# Patient Record
Sex: MALE | Race: WHITE | NOT HISPANIC OR LATINO | Employment: FULL TIME | URBAN - METROPOLITAN AREA
[De-identification: names, ages, dates, MRNs, and addresses within clinical notes are randomized per-mention and may not be internally consistent; named-entity substitution may affect disease eponyms.]

---

## 2023-04-06 ENCOUNTER — APPOINTMENT (OUTPATIENT)
Dept: LAB | Facility: CLINIC | Age: 28
End: 2023-04-06

## 2023-04-06 ENCOUNTER — APPOINTMENT (OUTPATIENT)
Dept: URGENT CARE | Facility: CLINIC | Age: 28
End: 2023-04-06

## 2023-04-06 DIAGNOSIS — Z02.1 PRE-EMPLOYMENT HEALTH SCREENING EXAMINATION: Primary | ICD-10-CM

## 2023-04-06 DIAGNOSIS — Z02.1 PRE-EMPLOYMENT HEALTH SCREENING EXAMINATION: ICD-10-CM

## 2023-04-06 LAB — RUBV IGG SERPL IA-ACNC: >175 IU/ML

## 2023-04-07 LAB
MEV IGG SER QL IA: NORMAL
MUV IGG SER QL IA: NORMAL
VZV IGG SER QL IA: NORMAL

## 2023-09-03 ENCOUNTER — APPOINTMENT (EMERGENCY)
Dept: RADIOLOGY | Facility: HOSPITAL | Age: 28
End: 2023-09-03
Payer: COMMERCIAL

## 2023-09-03 ENCOUNTER — HOSPITAL ENCOUNTER (EMERGENCY)
Facility: HOSPITAL | Age: 28
Discharge: HOME | End: 2023-09-03
Attending: EMERGENCY MEDICINE | Admitting: EMERGENCY MEDICINE
Payer: COMMERCIAL

## 2023-09-03 ENCOUNTER — APPOINTMENT (EMERGENCY)
Dept: CT IMAGING | Facility: HOSPITAL | Age: 28
End: 2023-09-03
Payer: COMMERCIAL

## 2023-09-03 VITALS
OXYGEN SATURATION: 96 % | RESPIRATION RATE: 17 BRPM | HEIGHT: 66 IN | HEART RATE: 85 BPM | SYSTOLIC BLOOD PRESSURE: 129 MMHG | TEMPERATURE: 98 F | WEIGHT: 240 LBS | DIASTOLIC BLOOD PRESSURE: 78 MMHG | BODY MASS INDEX: 38.57 KG/M2

## 2023-09-03 DIAGNOSIS — S49.92XA INJURY OF LEFT SHOULDER, INITIAL ENCOUNTER: ICD-10-CM

## 2023-09-03 DIAGNOSIS — S89.92XA INJURY OF LEFT KNEE, INITIAL ENCOUNTER: ICD-10-CM

## 2023-09-03 DIAGNOSIS — S20.212A BRUISED RIBS, LEFT, INITIAL ENCOUNTER: Primary | ICD-10-CM
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
## 2023-09-03 LAB
ANION GAP SERPL CALCULATED.3IONS-SCNC: 7 MMOL/L
APTT PPP: 25 SECONDS (ref 23–37)
ATRIAL RATE: 85 BPM
BASOPHILS # BLD AUTO: 0.07 THOUSANDS/ÂΜL (ref 0–0.1)
BASOPHILS NFR BLD AUTO: 1 % (ref 0–1)
BUN SERPL-MCNC: 18 MG/DL (ref 5–25)
CALCIUM SERPL-MCNC: 9.8 MG/DL (ref 8.4–10.2)
CHLORIDE SERPL-SCNC: 104 MMOL/L (ref 96–108)
CO2 SERPL-SCNC: 27 MMOL/L (ref 21–32)
CREAT SERPL-MCNC: 0.88 MG/DL (ref 0.6–1.3)
EOSINOPHIL # BLD AUTO: 0.18 THOUSAND/ÂΜL (ref 0–0.61)
EOSINOPHIL NFR BLD AUTO: 2 % (ref 0–6)
ERYTHROCYTE [DISTWIDTH] IN BLOOD BY AUTOMATED COUNT: 13.2 % (ref 11.6–15.1)
GFR SERPL CREATININE-BSD FRML MDRD: 115 ML/MIN/1.73SQ M
GLUCOSE SERPL-MCNC: 97 MG/DL (ref 65–140)
HCT VFR BLD AUTO: 44.7 % (ref 36.5–49.3)
HGB BLD-MCNC: 15.5 G/DL (ref 12–17)
IMM GRANULOCYTES # BLD AUTO: 0.1 THOUSAND/UL (ref 0–0.2)
IMM GRANULOCYTES NFR BLD AUTO: 1 % (ref 0–2)
INR PPP: 0.9 (ref 0.84–1.19)
LYMPHOCYTES # BLD AUTO: 4.78 THOUSANDS/ÂΜL (ref 0.6–4.47)
LYMPHOCYTES NFR BLD AUTO: 50 % (ref 14–44)
MCH RBC QN AUTO: 29.9 PG (ref 26.8–34.3)
MCHC RBC AUTO-ENTMCNC: 34.7 G/DL (ref 31.4–37.4)
MCV RBC AUTO: 86 FL (ref 82–98)
MONOCYTES # BLD AUTO: 0.81 THOUSAND/ÂΜL (ref 0.17–1.22)
MONOCYTES NFR BLD AUTO: 9 % (ref 4–12)
NEUTROPHILS # BLD AUTO: 3.49 THOUSANDS/ÂΜL (ref 1.85–7.62)
NEUTS SEG NFR BLD AUTO: 37 % (ref 43–75)
NRBC BLD AUTO-RTO: 0 /100 WBCS
P AXIS: 54 DEGREES
PLATELET # BLD AUTO: 302 THOUSANDS/UL (ref 149–390)
PMV BLD AUTO: 10.4 FL (ref 8.9–12.7)
POTASSIUM SERPL-SCNC: 3.7 MMOL/L (ref 3.5–5.3)
PR INTERVAL: 148 MS
PROTHROMBIN TIME: 12.7 SECONDS (ref 11.6–14.5)
QRS AXIS: 12 DEGREES
QRSD INTERVAL: 98 MS
QT INTERVAL: 368 MS
QTC INTERVAL: 437 MS
RBC # BLD AUTO: 5.19 MILLION/UL (ref 3.88–5.62)
SODIUM SERPL-SCNC: 138 MMOL/L (ref 135–147)
T WAVE AXIS: 47 DEGREES
VENTRICULAR RATE: 85 BPM
WBC # BLD AUTO: 9.43 THOUSAND/UL (ref 4.31–10.16)

## 2023-09-03 PROCEDURE — 85610 PROTHROMBIN TIME: CPT | Performed by: NURSE PRACTITIONER

## 2023-09-03 PROCEDURE — 71045 X-RAY EXAM CHEST 1 VIEW: CPT

## 2023-09-03 PROCEDURE — 80048 BASIC METABOLIC PNL TOTAL CA: CPT | Performed by: NURSE PRACTITIONER

## 2023-09-03 PROCEDURE — 73030 X-RAY EXAM OF SHOULDER: CPT

## 2023-09-03 PROCEDURE — 99284 EMERGENCY DEPT VISIT MOD MDM: CPT | Performed by: EMERGENCY MEDICINE

## 2023-09-03 PROCEDURE — 85025 COMPLETE CBC W/AUTO DIFF WBC: CPT | Performed by: NURSE PRACTITIONER

## 2023-09-03 PROCEDURE — 71260 CT THORAX DX C+: CPT

## 2023-09-03 PROCEDURE — 85730 THROMBOPLASTIN TIME PARTIAL: CPT | Performed by: NURSE PRACTITIONER

## 2023-09-03 PROCEDURE — 93005 ELECTROCARDIOGRAM TRACING: CPT

## 2023-09-03 PROCEDURE — 73564 X-RAY EXAM KNEE 4 OR MORE: CPT

## 2023-09-03 PROCEDURE — 74177 CT ABD & PELVIS W/CONTRAST: CPT

## 2023-09-03 PROCEDURE — 96374 THER/PROPH/DIAG INJ IV PUSH: CPT

## 2023-09-03 PROCEDURE — 93010 ELECTROCARDIOGRAM REPORT: CPT | Performed by: INTERNAL MEDICINE

## 2023-09-03 PROCEDURE — 36415 COLL VENOUS BLD VENIPUNCTURE: CPT | Performed by: NURSE PRACTITIONER

## 2023-09-03 PROCEDURE — 99285 EMERGENCY DEPT VISIT HI MDM: CPT

## 2023-09-03 RX ORDER — MORPHINE SULFATE 4 MG/ML
4 INJECTION, SOLUTION INTRAMUSCULAR; INTRAVENOUS ONCE
Status: COMPLETED | OUTPATIENT
Start: 2023-09-03 | End: 2023-09-03

## 2023-09-03 RX ORDER — HYDROCODONE BITARTRATE AND ACETAMINOPHEN 5; 325 MG/1; MG/1
1 TABLET ORAL EVERY 6 HOURS PRN
Qty: 20 TABLET | Refills: 0 | Status: SHIPPED | OUTPATIENT
Start: 2023-09-03

## 2023-09-03 RX ADMIN — MORPHINE SULFATE 4 MG: 4 INJECTION INTRAVENOUS at 07:06

## 2023-09-03 RX ADMIN — IOHEXOL 100 ML: 350 INJECTION, SOLUTION INTRAVENOUS at 07:30

## 2023-09-03 NOTE — Clinical Note
Keila Castrotasia was seen and treated in our emergency department on 9/3/2023. Diagnosis:     Erlin Green  may return to work on return date. He may return on this date: 09/06/2023         If you have any questions or concerns, please don't hesitate to call.       JORDYN Ludwig    ______________________________           _______________          _______________  Hospital Representative                              Date                                Time

## 2023-09-03 NOTE — ED NOTES
Discharged reviewed with pt. Pt verbalized understanding and has no further questions at this time. Pt ambulatory off unit with steady gait.      Leverne Hatchet, RN  09/03/23 3429
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
## 2023-09-15 RX ORDER — METHOCARBAMOL 750 MG/1
750 TABLET, FILM COATED ORAL 4 TIMES DAILY PRN
Qty: 30 TABLET | Refills: 0 | Status: SHIPPED | OUTPATIENT
Start: 2023-09-15 | End: 2023-09-25

## 2023-12-04 ENCOUNTER — OFFICE VISIT (OUTPATIENT)
Dept: FAMILY MEDICINE CLINIC | Facility: CLINIC | Age: 28
End: 2023-12-04
Payer: COMMERCIAL

## 2023-12-04 VITALS
BODY MASS INDEX: 39.86 KG/M2 | HEART RATE: 78 BPM | TEMPERATURE: 97.8 F | HEIGHT: 66 IN | DIASTOLIC BLOOD PRESSURE: 82 MMHG | SYSTOLIC BLOOD PRESSURE: 138 MMHG | WEIGHT: 248 LBS | OXYGEN SATURATION: 97 %

## 2023-12-04 DIAGNOSIS — Z13.220 NEED FOR LIPID SCREENING: ICD-10-CM

## 2023-12-04 DIAGNOSIS — Z13.1 SCREENING FOR DIABETES MELLITUS: ICD-10-CM

## 2023-12-04 DIAGNOSIS — Z00.00 HEALTH MAINTENANCE EXAMINATION: Primary | ICD-10-CM

## 2023-12-04 DIAGNOSIS — K64.9 HEMORRHOIDS, UNSPECIFIED HEMORRHOID TYPE: ICD-10-CM

## 2023-12-04 DIAGNOSIS — R51.9 NONINTRACTABLE HEADACHE, UNSPECIFIED CHRONICITY PATTERN, UNSPECIFIED HEADACHE TYPE: ICD-10-CM

## 2023-12-04 DIAGNOSIS — S20.212A BRUISED RIBS, LEFT, INITIAL ENCOUNTER: ICD-10-CM

## 2023-12-04 DIAGNOSIS — M62.830 MUSCLE SPASM OF BACK: ICD-10-CM

## 2023-12-04 PROCEDURE — 99203 OFFICE O/P NEW LOW 30 MIN: CPT | Performed by: FAMILY MEDICINE

## 2023-12-04 RX ORDER — RIMEGEPANT SULFATE 75 MG/75MG
75 TABLET, ORALLY DISINTEGRATING ORAL ONCE
Qty: 16 TABLET | Refills: 3 | Status: SHIPPED | OUTPATIENT
Start: 2023-12-04 | End: 2023-12-04

## 2023-12-04 RX ORDER — RIMEGEPANT SULFATE 75 MG/75MG
75 TABLET, ORALLY DISINTEGRATING ORAL ONCE
COMMUNITY
End: 2023-12-04 | Stop reason: SDUPTHER

## 2023-12-04 RX ORDER — METHOCARBAMOL 750 MG/1
750 TABLET, FILM COATED ORAL 4 TIMES DAILY PRN
Qty: 30 TABLET | Refills: 1 | Status: SHIPPED | OUTPATIENT
Start: 2023-12-04 | End: 2023-12-14
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
## 2023-12-05 ENCOUNTER — TELEPHONE (OUTPATIENT)
Dept: FAMILY MEDICINE CLINIC | Facility: CLINIC | Age: 28
End: 2023-12-05

## 2023-12-07 ENCOUNTER — TELEPHONE (OUTPATIENT)
Age: 28
End: 2023-12-07

## 2024-01-03 ENCOUNTER — OFFICE VISIT (OUTPATIENT)
Age: 29
End: 2024-01-03
Payer: COMMERCIAL

## 2024-01-03 VITALS
DIASTOLIC BLOOD PRESSURE: 86 MMHG | HEIGHT: 66 IN | HEART RATE: 93 BPM | BODY MASS INDEX: 39.73 KG/M2 | SYSTOLIC BLOOD PRESSURE: 142 MMHG | OXYGEN SATURATION: 98 % | WEIGHT: 247.2 LBS

## 2024-01-03 DIAGNOSIS — E66.9 OBESITY (BMI 30-39.9): ICD-10-CM

## 2024-01-03 DIAGNOSIS — Z86.69 HX OF MIGRAINES: ICD-10-CM

## 2024-01-03 DIAGNOSIS — K64.1 GRADE II HEMORRHOIDS: Primary | ICD-10-CM

## 2024-01-03 DIAGNOSIS — K64.9 HEMORRHOIDS, UNSPECIFIED HEMORRHOID TYPE: ICD-10-CM

## 2024-01-03 PROCEDURE — 99242 OFF/OP CONSLTJ NEW/EST SF 20: CPT | Performed by: COLON & RECTAL SURGERY

## 2024-01-03 RX ORDER — RIMEGEPANT SULFATE 75 MG/75MG
75 TABLET, ORALLY DISINTEGRATING ORAL ONCE
COMMUNITY
Start: 2023-12-11

## 2024-01-03 NOTE — PROGRESS NOTES
Assessment/Plan:  Grade 2 internal hemorrhoid right lateral left lateral.    Plan:    Conservative management for hemorrhoids patient instructed to take Citrucel caplets 2 p.o. twice daily.  Patient further advised that should the hemorrhoids rebleed and fail conservative therapy the patient would be a candidate for rubber band ligation.  Patient will follow-up as needed.       Diagnoses and all orders for this visit:    Grade II hemorrhoids    Hemorrhoids, unspecified hemorrhoid type  -     Ambulatory Referral to Colorectal Surgery    Obesity (BMI 30-39.9)    Hx of migraines    Other orders  -     Nurtec 75 MG TBDP; Take 75 mg by mouth once          Subjective:      Patient ID: John Alex is a 28 y.o. male.    HPI patient is a 28-year-old man with history of past intermittent rectal bleeding only with defecation.  Patient has not had any bleeding for the last 3 to 4 months patient with family history of ulcerative colitis in his brother.  Patient presents today for evaluation of mechanism for his anal bleeding with defecation    The following portions of the patient's history were reviewed and updated as appropriate: allergies, current medications, past family history, past medical history, past social history, past surgical history, and problem list.    Review of Systems   Constitutional:  Negative for chills and fever.   HENT:  Negative for ear pain and sore throat.    Eyes:  Negative for pain and visual disturbance.   Respiratory:  Negative for cough and shortness of breath.    Cardiovascular:  Negative for chest pain and palpitations.   Gastrointestinal:  Positive for anal bleeding. Negative for abdominal pain and vomiting.   Genitourinary:  Negative for dysuria and hematuria.   Musculoskeletal:  Negative for arthralgias and back pain.   Skin:  Negative for color change and rash.   Neurological:  Negative for seizures and syncope.   All other systems reviewed and are negative.        Objective:      BP  "142/86   Pulse 93   Ht 5' 6\" (1.676 m)   Wt 112 kg (247 lb 3.2 oz)   SpO2 98%   BMI 39.90 kg/m²          Physical Exam   Abdominal: Soft. Normal appearance and bowel sounds are normal.   Neurological: He is alert.         Inspection of the anal margin reveals a small left lateral external skin tag.    Digital rectal exam reveals normal sphincteric tone no palpable anal canal or external rectal masses.    Anoscopic evaluation reveals a grade 2 left lateral right lateral hemorrhoid.  "

## 2024-01-15 ENCOUNTER — APPOINTMENT (OUTPATIENT)
Dept: LAB | Facility: CLINIC | Age: 29
End: 2024-01-15
Payer: COMMERCIAL

## 2024-01-15 ENCOUNTER — LAB (OUTPATIENT)
Dept: LAB | Facility: CLINIC | Age: 29
End: 2024-01-15
Payer: COMMERCIAL

## 2024-01-15 DIAGNOSIS — Z13.1 SCREENING FOR DIABETES MELLITUS: ICD-10-CM

## 2024-01-15 DIAGNOSIS — Z13.220 NEED FOR LIPID SCREENING: ICD-10-CM

## 2024-01-15 LAB
ANION GAP SERPL CALCULATED.3IONS-SCNC: 6 MMOL/L
BUN SERPL-MCNC: 16 MG/DL (ref 5–25)
CALCIUM SERPL-MCNC: 9.7 MG/DL (ref 8.4–10.2)
CHLORIDE SERPL-SCNC: 106 MMOL/L (ref 96–108)
CHOLEST SERPL-MCNC: 164 MG/DL
CO2 SERPL-SCNC: 29 MMOL/L (ref 21–32)
CREAT SERPL-MCNC: 0.81 MG/DL (ref 0.6–1.3)
GFR SERPL CREATININE-BSD FRML MDRD: 119 ML/MIN/1.73SQ M
GLUCOSE P FAST SERPL-MCNC: 93 MG/DL (ref 65–99)
HDLC SERPL-MCNC: 52 MG/DL
LDLC SERPL CALC-MCNC: 87 MG/DL (ref 0–100)
NONHDLC SERPL-MCNC: 112 MG/DL
POTASSIUM SERPL-SCNC: 3.8 MMOL/L (ref 3.5–5.3)
SODIUM SERPL-SCNC: 141 MMOL/L (ref 135–147)
TRIGL SERPL-MCNC: 124 MG/DL

## 2024-01-15 PROCEDURE — 80061 LIPID PANEL: CPT

## 2024-01-15 PROCEDURE — 36415 COLL VENOUS BLD VENIPUNCTURE: CPT

## 2024-01-15 PROCEDURE — 80048 BASIC METABOLIC PNL TOTAL CA: CPT

## 2024-02-02 PROBLEM — Z00.00 HEALTH MAINTENANCE EXAMINATION: Status: RESOLVED | Noted: 2023-12-04 | Resolved: 2024-02-02

## 2024-07-11 ENCOUNTER — APPOINTMENT (OUTPATIENT)
Dept: LAB | Facility: CLINIC | Age: 29
End: 2024-07-11

## 2024-07-11 DIAGNOSIS — Z00.8 HEALTH EXAMINATION IN POPULATION SURVEY: ICD-10-CM

## 2024-07-11 LAB
EST. AVERAGE GLUCOSE BLD GHB EST-MCNC: 105 MG/DL
HBA1C MFR BLD: 5.3 %

## 2024-07-11 PROCEDURE — 36415 COLL VENOUS BLD VENIPUNCTURE: CPT

## 2024-07-11 PROCEDURE — 83036 HEMOGLOBIN GLYCOSYLATED A1C: CPT

## 2024-07-31 ENCOUNTER — CLINICAL SUPPORT (OUTPATIENT)
Dept: BARIATRICS | Facility: CLINIC | Age: 29
End: 2024-07-31

## 2024-07-31 VITALS — HEIGHT: 66 IN | WEIGHT: 231.8 LBS | BODY MASS INDEX: 37.25 KG/M2

## 2024-07-31 DIAGNOSIS — R63.5 ABNORMAL WEIGHT GAIN: Primary | ICD-10-CM

## 2024-07-31 PROCEDURE — RECHECK

## 2024-11-07 ENCOUNTER — OFFICE VISIT (OUTPATIENT)
Dept: CARDIOLOGY CLINIC | Facility: HOSPITAL | Age: 29
End: 2024-11-07
Payer: COMMERCIAL

## 2024-11-07 VITALS
SYSTOLIC BLOOD PRESSURE: 112 MMHG | BODY MASS INDEX: 37.45 KG/M2 | WEIGHT: 233 LBS | DIASTOLIC BLOOD PRESSURE: 68 MMHG | HEART RATE: 84 BPM | HEIGHT: 66 IN

## 2024-11-07 DIAGNOSIS — Z82.49 FAMILY HISTORY OF PREMATURE CAD: ICD-10-CM

## 2024-11-07 DIAGNOSIS — R06.09 DOE (DYSPNEA ON EXERTION): Primary | ICD-10-CM

## 2024-11-07 PROCEDURE — 99244 OFF/OP CNSLTJ NEW/EST MOD 40: CPT | Performed by: INTERNAL MEDICINE

## 2024-11-07 PROCEDURE — 93000 ELECTROCARDIOGRAM COMPLETE: CPT | Performed by: INTERNAL MEDICINE

## 2024-11-07 RX ORDER — LEVOCETIRIZINE DIHYDROCHLORIDE 5 MG/1
5 TABLET, FILM COATED ORAL EVERY EVENING
COMMUNITY

## 2024-11-07 NOTE — PROGRESS NOTES
John Isai  1995  [id number]  [facility] [address] [id number] [phone]    1. CHAUHAN (dyspnea on exertion)  POCT ECG    Echo complete w/ contrast if indicated    Stress test only, exercise    CT coronary calcium score      2. Family history of premature CAD            Discussion/Summary: Today is my first visit with the patient.  He has had some shortness of breath that occurs with and without exertion.  Feels that he gets winded doing mild activity.  Recommend a treadmill only stress test to evaluate functional capacity and blood pressure response to exercise.  I have also ordered an echocardiogram to evaluate for any underlying valvular heart disease.  He has a family history of premature CAD recommend a coronary calcium score.  Lipids have been doing well.  Blood pressure is well-controlled.  I will see him back next year.    Interval History: 29-year-old gentleman with a history of migraines presents as a new patient consult on behalf of Dr. Alexis for shortness of breath on exertion.  He tells me sometimes even doing mild things within the home such as getting out of bed and walking room to room can make him feel short of breath.  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  He denies any chest pain or discomfort at the time.  There is no palpitations or heart racing.  Denies any lower extremity edema, PND, orthopnea.  He has had no history of syncope.  No significant aggravating factors.  He does have a premature history of early coronary disease in his family.  Remote history of smoking    Medical Problems       Problem List       Muscle spasm of back    Bruised ribs, left, initial encounter    Nonintractable headache    CHAUHAN (dyspnea on exertion)        Past Medical History:   Diagnosis Date    Migraine      Social History     Socioeconomic History    Marital  status: /Civil Union     Spouse name: Not on file    Number of children: Not on file    Years of education: Not on file    Highest education level: Not on file   Occupational History    Not on file   Tobacco Use    Smoking status: Never    Smokeless tobacco: Never   Vaping Use    Vaping status: Never Used   Substance and Sexual Activity    Alcohol use: Never    Drug use: Never    Sexual activity: Not on file   Other Topics Concern    Not on file   Social History Narrative    Not on file     Social Determinants of Health     Financial Resource Strain: Not on file   Food Insecurity: Not on file   Transportation Needs: Not on file   Physical Activity: Not on file   Stress: Not on file   Social Connections: Unknown (6/18/2024)    Received from [employer]     How often do you feel lonely or isolated from those around you? (Adult - for ages 18 years and over): Not on file   Intimate Partner Violence: Not on file   Housing Stability: Not on file      History reviewed. No pertinent family history.  History reviewed. No pertinent surgical history.    Current Outpatient Medications:     levocetirizine (XYZAL) 5 MG tablet, Take 5 mg by mouth every evening, Disp: , Rfl:     methocarbamol (ROBAXIN) 750 mg tablet, Take 1 tablet (750 mg total) by mouth 4 (four) times a day as needed for muscle spasms for up to 10 days, Disp: 30 tablet, Rfl: 1    Multiple Vitamins-Minerals (MULTI ADULT GUMMIES PO), , Disp: , Rfl:     Nurtec 75 MG TBDP, Take 75 mg by mouth once, Disp: , Rfl:     psyllium (METAMUCIL) 0.52 g capsule, Take 0.52 g by mouth daily, Disp: , Rfl:   Allergies   Allergen Reactions    Sulfa Antibiotics Rash and GI Intolerance       Labs:     Chemistry        Component Value Date/Time    K 3.8 01/15/2024 1145     01/15/2024 1145    CO2 29 01/15/2024 1145    BUN 16 01/15/2024 1145    CREATININE 0.81 01/15/2024 1145        Component Value Date/Time    CALCIUM 9.7 01/15/2024 1145            No  "results found for: \"CHOL\"  Lab Results   Component Value Date    HDL 52 01/15/2024     Lab Results   Component Value Date    LDLCALC 87 01/15/2024     Lab Results   Component Value Date    TRIG 124 01/15/2024     No results found for: \"CHOLHDL\"    Imaging: No results found.    ECG:  NSR      Review of Systems   Constitutional: Negative.   HENT: Negative.     Eyes: Negative.    Cardiovascular: Negative.    Respiratory: Negative.     Endocrine: Negative.    Hematologic/Lymphatic: Negative.    Skin: Negative.    Musculoskeletal: Negative.    Gastrointestinal: Negative.    Genitourinary: Negative.    Neurological: Negative.    Psychiatric/Behavioral: Negative.     All other systems reviewed and are negative.      Vitals:    11/07/24 1337   BP: 112/68   Pulse: 84     Vitals:    11/07/24 1337   Weight: 106 kg (233 lb)     Height: 5' 6\" (167.6 cm)   Body mass index is 37.61 kg/m².    Physical Exam:  Vital signs reviewed.  General appearance:  Appears stated age, alert, well appearing and in no distress  HEENT:  PERRLA, EOMI, no scleral icterus, no conjunctival pallor  NECK:  Supple, No elevated JVP, no thyromegaly, no carotid bruits, no JVD  HEART:  Regular rate and rhythm, normal S1/S2, no S3/S4, no murmur or rub, PMI nondisplaced  LUNGS:  Clear to auscultation bilaterally, no wheezes rales or rhonchi  ABDOMEN:  Soft, non-tender, positive bowel sounds, no rebound or guarding, no organomegaly   EXTREMITIES:  Normal range of motion.  No clubbing or cyanosis. No edema  VASCULAR:  Normal pedal pulses   SKIN: No lesions or rashes on exposed skin  NEURO:  CN II-XII intact, no focal deficits    "

## 2024-11-29 ENCOUNTER — HOSPITAL ENCOUNTER (OUTPATIENT)
Dept: CT IMAGING | Facility: HOSPITAL | Age: 29
End: 2024-11-29
Payer: COMMERCIAL

## 2024-11-29 DIAGNOSIS — R06.09 DOE (DYSPNEA ON EXERTION): ICD-10-CM

## 2024-11-29 PROCEDURE — 75571 CT HRT W/O DYE W/CA TEST: CPT

## 2024-12-05 ENCOUNTER — HOSPITAL ENCOUNTER (OUTPATIENT)
Dept: NON INVASIVE DIAGNOSTICS | Facility: CLINIC | Age: 29
Discharge: HOME | End: 2024-12-05
Payer: COMMERCIAL

## 2024-12-05 VITALS
SYSTOLIC BLOOD PRESSURE: 112 MMHG | BODY MASS INDEX: 37.45 KG/M2 | HEIGHT: 66 IN | WEIGHT: 233 LBS | HEART RATE: 62 BPM | DIASTOLIC BLOOD PRESSURE: 68 MMHG

## 2024-12-05 VITALS
WEIGHT: 233 LBS | SYSTOLIC BLOOD PRESSURE: 142 MMHG | HEART RATE: 63 BPM | OXYGEN SATURATION: 98 % | DIASTOLIC BLOOD PRESSURE: 86 MMHG | HEIGHT: 66 IN | BODY MASS INDEX: 37.45 KG/M2

## 2024-12-05 DIAGNOSIS — R06.09 DOE (DYSPNEA ON EXERTION): ICD-10-CM

## 2024-12-05 LAB
AORTIC ROOT: 3.4 CM
APICAL FOUR CHAMBER EJECTION FRACTION: 60 %
ASCENDING AORTA: 2.7 CM
BSA FOR ECHO PROCEDURE: 2.13 M2
CHEST PAIN STATEMENT: NORMAL
E WAVE DECELERATION TIME: 161 MS
E/A RATIO: 1.48
FRACTIONAL SHORTENING: 28 (ref 28–44)
INTERVENTRICULAR SEPTUM IN DIASTOLE (PARASTERNAL SHORT AXIS VIEW): 1.1 CM
INTERVENTRICULAR SEPTUM: 1.1 CM (ref 0.6–1.1)
LAAS-AP2: 16.1 CM2
LAAS-AP4: 20.4 CM2
LEFT ATRIUM SIZE: 3.1 CM
LEFT ATRIUM VOLUME (MOD BIPLANE): 57 ML
LEFT ATRIUM VOLUME INDEX (MOD BIPLANE): 26.8 ML/M2
LEFT INTERNAL DIMENSION IN SYSTOLE: 3.1 CM (ref 2.1–4)
LEFT VENTRICULAR INTERNAL DIMENSION IN DIASTOLE: 4.3 CM (ref 3.5–6)
LEFT VENTRICULAR POSTERIOR WALL IN END DIASTOLE: 1 CM
LEFT VENTRICULAR STROKE VOLUME: 48 ML
LVSV (TEICH): 48 ML
MAX DIASTOLIC BP: 54 MMHG
MAX HR PERCENT: 93 %
MAX HR: 176 BPM
MAX PREDICTED HEART RATE: 189 BPM
MV E'TISSUE VEL-SEP: 14 CM/S
MV PEAK A VEL: 0.58 M/S
MV PEAK E VEL: 86 CM/S
MV STENOSIS PRESSURE HALF TIME: 47 MS
MV VALVE AREA P 1/2 METHOD: 4.68
PROTOCOL NAME: NORMAL
RATE PRESSURE PRODUCT: NORMAL
REASON FOR TERMINATION: NORMAL
RIGHT ATRIUM AREA SYSTOLE A4C: 13.8 CM2
RIGHT VENTRICLE ID DIMENSION: 3.4 CM
SL CV LEFT ATRIUM LENGTH A2C: 4.5 CM
SL CV LV EF: 58
SL CV PED ECHO LEFT VENTRICLE DIASTOLIC VOLUME (MOD BIPLANE) 2D: 85 ML
SL CV PED ECHO LEFT VENTRICLE SYSTOLIC VOLUME (MOD BIPLANE) 2D: 37 ML
SL CV STRESS RECOVERY BP: NORMAL MMHG
SL CV STRESS RECOVERY HR: 114 BPM
SL CV STRESS RECOVERY O2 SAT: 97 %
SL CV STRESS STAGE REACHED: 4
STRESS ANGINA INDEX: 0
STRESS BASELINE BP: NORMAL MMHG
STRESS BASELINE HR: 63 BPM
STRESS O2 SAT REST: 98 %
STRESS PEAK HR: 176 BPM
STRESS POST ESTIMATED WORKLOAD: 13.4 METS
STRESS POST EXERCISE DUR MIN: 10 MIN
STRESS POST EXERCISE DUR MIN: 10 MIN
STRESS POST EXERCISE DUR SEC: 1 SEC
STRESS POST O2 SAT PEAK: 97 %
STRESS POST PEAK BP: 194 MMHG
STRESS POST PEAK HR: 179 BPM
STRESS POST PEAK SYSTOLIC BP: 194 MMHG
TARGET HR FORMULA: NORMAL
TEST INDICATION: NORMAL
TRICUSPID ANNULAR PLANE SYSTOLIC EXCURSION: 2 CM

## 2024-12-05 PROCEDURE — 93306 TTE W/DOPPLER COMPLETE: CPT | Performed by: INTERNAL MEDICINE

## 2024-12-05 PROCEDURE — 93016 CV STRESS TEST SUPVJ ONLY: CPT | Performed by: INTERNAL MEDICINE

## 2024-12-05 PROCEDURE — 93018 CV STRESS TEST I&R ONLY: CPT | Performed by: INTERNAL MEDICINE

## 2024-12-05 PROCEDURE — 93017 CV STRESS TEST TRACING ONLY: CPT

## 2024-12-05 PROCEDURE — 93306 TTE W/DOPPLER COMPLETE: CPT

## 2025-02-01 DIAGNOSIS — R51.9 NONINTRACTABLE HEADACHE, UNSPECIFIED CHRONICITY PATTERN, UNSPECIFIED HEADACHE TYPE: Primary | ICD-10-CM

## 2025-02-01 DIAGNOSIS — K59.09 OTHER CONSTIPATION: ICD-10-CM

## 2025-02-01 DIAGNOSIS — S20.212A BRUISED RIBS, LEFT, INITIAL ENCOUNTER: ICD-10-CM

## 2025-02-01 DIAGNOSIS — T78.40XA ALLERGY, INITIAL ENCOUNTER: ICD-10-CM

## 2025-02-03 RX ORDER — RIMEGEPANT SULFATE 75 MG/75MG
75 TABLET, ORALLY DISINTEGRATING ORAL ONCE
Qty: 1 TABLET | Refills: 0 | Status: SHIPPED | OUTPATIENT
Start: 2025-02-03 | End: 2025-02-12 | Stop reason: SDUPTHER

## 2025-02-03 RX ORDER — METHOCARBAMOL 750 MG/1
750 TABLET, FILM COATED ORAL 4 TIMES DAILY PRN
Qty: 30 TABLET | Refills: 0 | Status: SHIPPED | OUTPATIENT
Start: 2025-02-03 | End: 2025-02-13

## 2025-02-03 RX ORDER — LEVOCETIRIZINE DIHYDROCHLORIDE 5 MG/1
5 TABLET, FILM COATED ORAL EVERY EVENING
Qty: 30 TABLET | Refills: 0 | Status: SHIPPED | OUTPATIENT
Start: 2025-02-03

## 2025-02-04 ENCOUNTER — TELEPHONE (OUTPATIENT)
Dept: FAMILY MEDICINE CLINIC | Facility: CLINIC | Age: 30
End: 2025-02-04

## 2025-02-06 NOTE — TELEPHONE ENCOUNTER
PA for Nurtec 75 MG TBDP SUBMITTED to     via    []CMM-KEY:   [x]Surescripts-Case ID # [id number]   []Availity-Auth ID # NDC #   []Faxed to plan   []Other website   []Phone call Case ID #     [x]PA sent as URGENT    All office notes, labs and other pertaining documents and studies sent. Clinical questions answered. Awaiting determination from insurance company.     Turnaround time for your insurance to make a decision on your Prior Authorization can take 7-21 business days.

## 2025-02-12 ENCOUNTER — TELEPHONE (OUTPATIENT)
Dept: FAMILY MEDICINE CLINIC | Facility: CLINIC | Age: 30
End: 2025-02-12

## 2025-02-12 RX ORDER — RIMEGEPANT SULFATE 75 MG/75MG
75 TABLET, ORALLY DISINTEGRATING ORAL ONCE
Qty: 16 TABLET | Refills: 0 | Status: SHIPPED | OUTPATIENT
Start: 2025-02-12 | End: 2025-02-12

## 2025-02-12 NOTE — TELEPHONE ENCOUNTER
Patient called advising the Nurtec 75 mg was sent as 1 tablets and not as 16 tablets (1 box) asked if a new prescription could please be resent.

## 2025-02-12 NOTE — TELEPHONE ENCOUNTER
PA for Nurtec 75 MG TBDP   APPROVED     Date(s) approved February 6, 2025 to February 6, 2026     Case #[id number]     Patient advised by          []Movityhart Message  []Phone call   [x]LMOM  []L/M to call office as no active Communication consent on file  []Unable to leave detailed message as VM not approved on Communication consent       Pharmacy advised by    [x]Fax  []Phone call    Approval letter scanned into Media No Not available at decision

## 2025-02-13 DIAGNOSIS — R53.83 OTHER FATIGUE: ICD-10-CM

## 2025-02-13 DIAGNOSIS — Z13.220 NEED FOR LIPID SCREENING: ICD-10-CM

## 2025-02-13 DIAGNOSIS — Z13.1 SCREENING FOR DIABETES MELLITUS: Primary | ICD-10-CM

## 2025-02-19 ENCOUNTER — APPOINTMENT (OUTPATIENT)
Dept: LAB | Facility: CLINIC | Age: 30
End: 2025-02-19
Payer: COMMERCIAL

## 2025-02-19 DIAGNOSIS — R53.83 OTHER FATIGUE: ICD-10-CM

## 2025-02-19 DIAGNOSIS — Z13.1 SCREENING FOR DIABETES MELLITUS: ICD-10-CM

## 2025-02-19 DIAGNOSIS — Z13.220 NEED FOR LIPID SCREENING: ICD-10-CM

## 2025-02-19 LAB
ALBUMIN SERPL BCG-MCNC: 4.7 G/DL (ref 3.5–5)
ALP SERPL-CCNC: 70 U/L (ref 34–104)
ALT SERPL W P-5'-P-CCNC: 36 U/L (ref 7–52)
ANION GAP SERPL CALCULATED.3IONS-SCNC: 9 MMOL/L (ref 4–13)
AST SERPL W P-5'-P-CCNC: 22 U/L (ref 13–39)
BASOPHILS # BLD AUTO: 0.06 THOUSANDS/ΜL (ref 0–0.1)
BASOPHILS NFR BLD AUTO: 1 % (ref 0–1)
BILIRUB SERPL-MCNC: 0.62 MG/DL (ref 0.2–1)
BUN SERPL-MCNC: 18 MG/DL (ref 5–25)
CALCIUM SERPL-MCNC: 10 MG/DL (ref 8.4–10.2)
CHLORIDE SERPL-SCNC: 102 MMOL/L (ref 96–108)
CHOLEST SERPL-MCNC: 191 MG/DL (ref ?–200)
CO2 SERPL-SCNC: 28 MMOL/L (ref 21–32)
CREAT SERPL-MCNC: 0.72 MG/DL (ref 0.6–1.3)
EOSINOPHIL # BLD AUTO: 0.18 THOUSAND/ΜL (ref 0–0.61)
EOSINOPHIL NFR BLD AUTO: 2 % (ref 0–6)
ERYTHROCYTE [DISTWIDTH] IN BLOOD BY AUTOMATED COUNT: 13.1 % (ref 11.6–15.1)
GFR SERPL CREATININE-BSD FRML MDRD: 124 ML/MIN/1.73SQ M
GLUCOSE P FAST SERPL-MCNC: 92 MG/DL (ref 65–99)
HCT VFR BLD AUTO: 48.3 % (ref 36.5–49.3)
HDLC SERPL-MCNC: 56 MG/DL
HGB BLD-MCNC: 16.1 G/DL (ref 12–17)
IMM GRANULOCYTES # BLD AUTO: 0.03 THOUSAND/UL (ref 0–0.2)
IMM GRANULOCYTES NFR BLD AUTO: 0 % (ref 0–2)
LDLC SERPL CALC-MCNC: 112 MG/DL (ref 0–100)
LYMPHOCYTES # BLD AUTO: 3.37 THOUSANDS/ΜL (ref 0.6–4.47)
LYMPHOCYTES NFR BLD AUTO: 40 % (ref 14–44)
MCH RBC QN AUTO: 29.4 PG (ref 26.8–34.3)
MCHC RBC AUTO-ENTMCNC: 33.3 G/DL (ref 31.4–37.4)
MCV RBC AUTO: 88 FL (ref 82–98)
MONOCYTES # BLD AUTO: 0.62 THOUSAND/ΜL (ref 0.17–1.22)
MONOCYTES NFR BLD AUTO: 7 % (ref 4–12)
NEUTROPHILS # BLD AUTO: 4.21 THOUSANDS/ΜL (ref 1.85–7.62)
NEUTS SEG NFR BLD AUTO: 50 % (ref 43–75)
NONHDLC SERPL-MCNC: 135 MG/DL
NRBC BLD AUTO-RTO: 0 /100 WBCS
PLATELET # BLD AUTO: 288 THOUSANDS/UL (ref 149–390)
PMV BLD AUTO: 10.6 FL (ref 8.9–12.7)
POTASSIUM SERPL-SCNC: 4 MMOL/L (ref 3.5–5.3)
PROT SERPL-MCNC: 7.6 G/DL (ref 6.4–8.4)
RBC # BLD AUTO: 5.48 MILLION/UL (ref 3.88–5.62)
SODIUM SERPL-SCNC: 139 MMOL/L (ref 135–147)
TRIGL SERPL-MCNC: 113 MG/DL (ref ?–150)
WBC # BLD AUTO: 8.47 THOUSAND/UL (ref 4.31–10.16)

## 2025-02-19 PROCEDURE — 80053 COMPREHEN METABOLIC PANEL: CPT

## 2025-02-19 PROCEDURE — 36415 COLL VENOUS BLD VENIPUNCTURE: CPT

## 2025-02-19 PROCEDURE — 85025 COMPLETE CBC W/AUTO DIFF WBC: CPT

## 2025-02-19 PROCEDURE — 80061 LIPID PANEL: CPT

## 2025-02-20 ENCOUNTER — RESULTS FOLLOW-UP (OUTPATIENT)
Dept: FAMILY MEDICINE CLINIC | Facility: CLINIC | Age: 30
End: 2025-02-20

## 2025-03-12 ENCOUNTER — OFFICE VISIT (OUTPATIENT)
Dept: FAMILY MEDICINE CLINIC | Facility: CLINIC | Age: 30
End: 2025-03-12
Payer: COMMERCIAL

## 2025-03-12 VITALS
BODY MASS INDEX: 38.31 KG/M2 | OXYGEN SATURATION: 97 % | SYSTOLIC BLOOD PRESSURE: 114 MMHG | HEIGHT: 66 IN | HEART RATE: 72 BPM | DIASTOLIC BLOOD PRESSURE: 88 MMHG | WEIGHT: 238.4 LBS | TEMPERATURE: 97.7 F

## 2025-03-12 DIAGNOSIS — Z00.00 ANNUAL PHYSICAL EXAM: ICD-10-CM

## 2025-03-12 DIAGNOSIS — R51.9 NONINTRACTABLE HEADACHE, UNSPECIFIED CHRONICITY PATTERN, UNSPECIFIED HEADACHE TYPE: Primary | ICD-10-CM

## 2025-03-12 PROBLEM — R06.09 DOE (DYSPNEA ON EXERTION): Status: RESOLVED | Noted: 2024-11-07 | Resolved: 2025-03-12

## 2025-03-12 PROBLEM — M62.830 MUSCLE SPASM OF BACK: Status: RESOLVED | Noted: 2023-12-04 | Resolved: 2025-03-12

## 2025-03-12 PROBLEM — S20.212A: Status: RESOLVED | Noted: 2023-12-04 | Resolved: 2025-03-12

## 2025-03-12 PROCEDURE — 99213 OFFICE O/P EST LOW 20 MIN: CPT | Performed by: FAMILY MEDICINE

## 2025-03-12 PROCEDURE — 99395 PREV VISIT EST AGE 18-39: CPT | Performed by: FAMILY MEDICINE

## 2025-03-12 RX ORDER — RIMEGEPANT SULFATE 75 MG/75MG
75 TABLET, ORALLY DISINTEGRATING ORAL EVERY OTHER DAY
Qty: 30 TABLET | Refills: 3 | Status: SHIPPED | OUTPATIENT
Start: 2025-03-12

## 2025-03-12 RX ORDER — KETOROLAC TROMETHAMINE 10 MG/1
10 TABLET, FILM COATED ORAL EVERY 6 HOURS PRN
Qty: 30 TABLET | Refills: 3 | Status: SHIPPED | OUTPATIENT
Start: 2025-03-12

## 2025-03-12 RX ORDER — ONDANSETRON 4 MG/1
4 TABLET, FILM COATED ORAL EVERY 8 HOURS PRN
Qty: 20 TABLET | Refills: 2 | Status: SHIPPED | OUTPATIENT
Start: 2025-03-12

## 2025-03-12 RX ORDER — RIMEGEPANT SULFATE 75 MG/75MG
TABLET, ORALLY DISINTEGRATING ORAL
COMMUNITY
End: 2025-03-12 | Stop reason: SDUPTHER

## 2025-03-12 NOTE — PROGRESS NOTES
Adult Annual Physical  Name: John Alex      : 1995      MRN: [id number]  Encounter Provider: Daren Alexis MD  Encounter Date: 3/12/2025   Encounter department: [facility]    Assessment & Plan  Nonintractable headache, unspecified chronicity pattern, unspecified headache type    Orders:  •  Nurtec 75 MG TBDP; Take 1 tablet (75 mg total) by mouth every other day  •  ondansetron (ZOFRAN) 4 mg tablet; Take 1 tablet (4 mg total) by mouth every 8 (eight) hours as needed for nausea or vomiting  •  ketorolac (TORADOL) 10 mg tablet; Take 1 tablet (10 mg total) by mouth every 6 (six) hours as needed for moderate pain    Annual physical exam         Preventive Screenings:  - Diabetes Screening: screening up-to-date  - Cholesterol Screening: screening up-to-date   - Hepatitis C screening: screening not indicated   - HIV screening: screening not indicated   - Colon cancer screening: screening not indicated   - Lung cancer screening: screening not indicated   - Prostate cancer screening: screening not indicated     Immunizations:  - Immunizations due: Influenza         History of Present Illness     Adult Annual Physical:  Patient presents for annual physical.     Diet and Physical Activity:  - Diet/Nutrition: no special diet.  - Exercise: walking, strength training exercises, 3-4 times a week on average and 30-60 minutes on average.    Depression Screening:  - PHQ-2 Score: 0    General Health:  - Sleep: sleeps well and 7-8 hours of sleep on average.  - Hearing: normal hearing right ear.  - Vision: no vision problems.  - Dental: regular dental visits, brushes teeth twice daily and floss regularly.     Health:  - History of STDs: no.   - Urinary symptoms: none.     Advanced Care Planning:  - Has an advanced directive?: no    - Has a durable medical POA?: no    - ACP document given to patient?: no      Review of Systems   Constitutional:  Negative for activity change, appetite change, fatigue  "and fever.   HENT:  Negative for congestion and ear discharge.    Respiratory:  Negative for cough and shortness of breath.    Cardiovascular:  Negative for chest pain and palpitations.   Gastrointestinal:  Negative for diarrhea and nausea.   Musculoskeletal:  Negative for arthralgias and back pain.   Skin:  Negative for color change and rash.   Neurological:  Negative for dizziness and headaches.   Psychiatric/Behavioral:  Negative for agitation and behavioral problems.          Objective   /88   Pulse 72   Temp 97.7 °F (36.5 °C)   Ht 5' 6\" (1.676 m)   Wt 108 kg (238 lb 6.4 oz)   SpO2 97%   BMI 38.48 kg/m²     Physical Exam  Constitutional:       General: He is not in acute distress.     Appearance: He is well-developed. He is not diaphoretic.   Eyes:      General: No scleral icterus.     Pupils: Pupils are equal, round, and reactive to light.   Cardiovascular:      Rate and Rhythm: Normal rate and regular rhythm.      Heart sounds: Normal heart sounds. No murmur heard.  Pulmonary:      Effort: Pulmonary effort is normal. No respiratory distress.      Breath sounds: Normal breath sounds. No wheezing.   Abdominal:      General: Bowel sounds are normal. There is no distension.      Palpations: Abdomen is soft.      Tenderness: There is no abdominal tenderness.   Skin:     General: Skin is warm and dry.      Findings: No rash.   Neurological:      Mental Status: He is alert and oriented to person, place, and time.         "

## 2025-03-12 NOTE — PATIENT INSTRUCTIONS
"Patient Education     Routine physical for adults   The Basics   Written by the doctors and editors at [facility]   What is a physical? -- A physical is a routine visit, or \"check-up,\" with your doctor. You might also hear it called a \"wellness visit\" or \"preventive visit.\"  During each visit, the doctor will:   Ask about your physical and mental health   Ask about your habits, behaviors, and lifestyle   Do an exam   Give you vaccines if needed   Talk to you about any medicines you take   Give advice about your health   Answer your questions  Getting regular check-ups is an important part of taking care of your health. It can help your doctor find and treat any problems you have. But it's also important for preventing health problems.  A routine physical is different from a \"sick visit.\" A sick visit is when you see a doctor because of a health concern or problem. Since physicals are scheduled ahead of time, you can think about what you want to ask the doctor.  How often should I get a physical? -- It depends on your age and health. In general, for people age 21 years and older:   If you are younger than 50 years, you might be able to get a physical every 3 years.   If you are 50 years or older, your doctor might recommend a physical every year.  If you have an ongoing health condition, like diabetes or high blood pressure, your doctor will probably want to see you more often.  What happens during a physical? -- In general, each visit will include:   Physical exam - The doctor or nurse will check your height, weight, heart rate, and blood pressure. They will also look at your eyes and ears. They will ask about how you are feeling and whether you have any symptoms that bother you.   Medicines - It's a good idea to bring a list of all the medicines you take to each doctor visit. Your doctor will talk to you about your medicines and answer any questions. Tell them if you are having any side effects that bother you. You " "should also tell them if you are having trouble paying for any of your medicines.   Habits and behaviors - This includes:   Your diet   Your exercise habits   Whether you smoke, drink alcohol, or use drugs   Whether you are sexually active   Whether you feel safe at home  Your doctor will talk to you about things you can do to improve your health and lower your risk of health problems. They will also offer help and support. For example, if you want to quit smoking, they can give you advice and might prescribe medicines. If you want to improve your diet or get more physical activity, they can help you with this, too.   Lab tests, if needed - The tests you get will depend on your age and situation. For example, your doctor might want to check your:   Cholesterol   Blood sugar   Iron level   Vaccines - The recommended vaccines will depend on your age, health, and what vaccines you already had. Vaccines are very important because they can prevent certain serious or deadly infections.   Discussion of screening - \"Screening\" means checking for diseases or other health problems before they cause symptoms. Your doctor can recommend screening based on your age, risk, and preferences. This might include tests to check for:   Cancer, such as breast, prostate, cervical, ovarian, colorectal, prostate, lung, or skin cancer   Sexually transmitted infections, such as chlamydia and gonorrhea   Mental health conditions like depression and anxiety  Your doctor will talk to you about the different types of screening tests. They can help you decide which screenings to have. They can also explain what the results might mean.   Answering questions - The physical is a good time to ask the doctor or nurse questions about your health. If needed, they can refer you to other doctors or specialists, too.  Adults older than 65 years often need other care, too. As you get older, your doctor will talk to you about:   How to prevent falling at " home   Hearing or vision tests   Memory testing   How to take your medicines safely   Making sure that you have the help and support you need at home  All topics are updated as new evidence becomes available and our peer review process is complete.  This topic retrieved from [employer] on: May 02, 2024.  Topic 541126 Version 1.0  Release: 32.4.3 - C32.122  © 2024 UpToDate, Inc. and/or its affiliates. All rights reserved.  Consumer Information Use and Disclaimer   Disclaimer: This generalized information is a limited summary of diagnosis, treatment, and/or medication information. It is not meant to be comprehensive and should be used as a tool to help the user understand and/or assess potential diagnostic and treatment options. It does NOT include all information about conditions, treatments, medications, side effects, or risks that may apply to a specific patient. It is not intended to be medical advice or a substitute for the medical advice, diagnosis, or treatment of a health care provider based on the health care provider's examination and assessment of a patient's specific and unique circumstances. Patients must speak with a health care provider for complete information about their health, medical questions, and treatment options, including any risks or benefits regarding use of medications. This information does not endorse any treatments or medications as safe, effective, or approved for treating a specific patient. UpToDate, Inc. and its affiliates disclaim any warranty or liability relating to this information or the use thereof.The use of this information is governed by the Terms of Use, available at https://www.woltersPlaceIQuwer.com/en/know/clinical-effectiveness-terms. 2024© UpToDate, Inc. and its affiliates and/or licensors. All rights reserved.  Copyright   © 2024 UpToDate, Inc. and/or its affiliates. All rights reserved.

## 2025-03-12 NOTE — PROGRESS NOTES
"Name: John Alex      : 1995      MRN: [id number]  Encounter Provider: Daren Alexis MD  Encounter Date: 3/12/2025   Encounter department: [facility]  :  Assessment & Plan  Nonintractable headache, unspecified chronicity pattern, unspecified headache type  After discussing risks and benefits of medication along with side effects will start the following:   Orders:  •  Nurtec 75 MG TBDP; Take 1 tablet (75 mg total) by mouth every other day  •  ondansetron (ZOFRAN) 4 mg tablet; Take 1 tablet (4 mg total) by mouth every 8 (eight) hours as needed for nausea or vomiting  •  ketorolac (TORADOL) 10 mg tablet; Take 1 tablet (10 mg total) by mouth every 6 (six) hours as needed for moderate pain    Annual physical exam  Normal exam    Follow up in 3 months              History of Present Illness   Patient is here due to headaches that occur 15 times per 1 month. His headaches are located on the back and front of the head has nausea at times associated with it.      Review of Systems   Constitutional:  Negative for activity change, appetite change, fatigue and fever.   HENT:  Negative for congestion and ear discharge.    Respiratory:  Negative for cough and shortness of breath.    Cardiovascular:  Negative for chest pain and palpitations.   Gastrointestinal:  Negative for diarrhea and nausea.   Musculoskeletal:  Negative for arthralgias and back pain.   Skin:  Negative for color change and rash.   Neurological:  Positive for headaches. Negative for dizziness.   Psychiatric/Behavioral:  Negative for agitation and behavioral problems.        Objective   /88   Pulse 72   Temp 97.7 °F (36.5 °C)   Ht 5' 6\" (1.676 m)   Wt 108 kg (238 lb 6.4 oz)   SpO2 97%   BMI 38.48 kg/m²      Physical Exam  Constitutional:       General: He is not in acute distress.     Appearance: He is well-developed. He is not diaphoretic.   Eyes:      General: No scleral icterus.     Pupils: Pupils are equal, " round, and reactive to light.   Cardiovascular:      Rate and Rhythm: Normal rate and regular rhythm.      Heart sounds: Normal heart sounds. No murmur heard.  Pulmonary:      Effort: Pulmonary effort is normal. No respiratory distress.      Breath sounds: Normal breath sounds. No wheezing.   Abdominal:      General: Bowel sounds are normal. There is no distension.      Palpations: Abdomen is soft.      Tenderness: There is no abdominal tenderness.   Skin:     General: Skin is warm and dry.      Findings: No rash.   Neurological:      Mental Status: He is alert and oriented to person, place, and time.

## 2025-03-12 NOTE — ASSESSMENT & PLAN NOTE
Orders:  •  Nurtec 75 MG TBDP; Take 1 tablet (75 mg total) by mouth every other day  •  ondansetron (ZOFRAN) 4 mg tablet; Take 1 tablet (4 mg total) by mouth every 8 (eight) hours as needed for nausea or vomiting  •  ketorolac (TORADOL) 10 mg tablet; Take 1 tablet (10 mg total) by mouth every 6 (six) hours as needed for moderate pain

## 2025-03-12 NOTE — ASSESSMENT & PLAN NOTE
After discussing risks and benefits of medication along with side effects will start the following:   Orders:  •  Nurtec 75 MG TBDP; Take 1 tablet (75 mg total) by mouth every other day  •  ondansetron (ZOFRAN) 4 mg tablet; Take 1 tablet (4 mg total) by mouth every 8 (eight) hours as needed for nausea or vomiting  •  ketorolac (TORADOL) 10 mg tablet; Take 1 tablet (10 mg total) by mouth every 6 (six) hours as needed for moderate pain

## 2025-03-17 DIAGNOSIS — S20.212A BRUISED RIBS, LEFT, INITIAL ENCOUNTER: ICD-10-CM

## 2025-03-20 RX ORDER — METHOCARBAMOL 750 MG/1
750 TABLET, FILM COATED ORAL 4 TIMES DAILY PRN
Qty: 30 TABLET | Refills: 0 | Status: SHIPPED | OUTPATIENT
Start: 2025-03-20 | End: 2025-03-30

## 2025-03-31 ENCOUNTER — TELEPHONE (OUTPATIENT)
Age: 30
End: 2025-03-31

## 2025-03-31 NOTE — TELEPHONE ENCOUNTER
Received call from Patient to confirm his appt. Confirmed 4/3/25 2:30 pm Vikas Ramos. Verified insurance, provided [place] addr. Patient verbalized understanding.

## 2025-04-03 ENCOUNTER — OFFICE VISIT (OUTPATIENT)
Age: 30
End: 2025-04-03
Payer: COMMERCIAL

## 2025-04-03 VITALS — TEMPERATURE: 97.7 F | BODY MASS INDEX: 38.74 KG/M2 | WEIGHT: 240 LBS

## 2025-04-03 DIAGNOSIS — L80 VITILIGO: Primary | ICD-10-CM

## 2025-04-03 DIAGNOSIS — L21.9 SEBORRHEIC DERMATITIS: ICD-10-CM

## 2025-04-03 PROCEDURE — 99204 OFFICE O/P NEW MOD 45 MIN: CPT | Performed by: REGISTERED NURSE

## 2025-04-03 RX ORDER — KETOCONAZOLE 20 MG/ML
SHAMPOO, SUSPENSION TOPICAL
Qty: 120 ML | Refills: 5 | Status: SHIPPED | OUTPATIENT
Start: 2025-04-03

## 2025-04-03 NOTE — PROGRESS NOTES
"[facility] Note     Patient Name: John Alex  Encounter Date: 4/3/2025      Have you been cared for by a [facility] Dermatologist in the last 3 years and, if so, which description applies to you?    NO.   I am considered a \"new\" patient and must complete all patient intake questions. I am MALE/not capable of bearing children.    REVIEW OF SYSTEMS:  Have you recently had or currently have any of the following? Recent fever or chills? No  Any non-healing wound? No   PAST MEDICAL HISTORY:  Have you personally ever had or currently have any of the following?  If \"YES,\" then please provide more detail. Skin cancer (such as Melanoma, Basal Cell Carcinoma, Squamous Cell Carcinoma?  No  Tuberculosis, HIV/AIDS, Hepatitis B or C: No  Radiation Treatment No   HISTORY OF IMMUNOSUPPRESSION:   Do you have a history of any of the following:  Systemic Immunosuppression such as Diabetes, Biologic or Immunotherapy, Chemotherapy, Organ Transplantation, Bone Marrow Transplantation or Prednisone?  No     Answering \"YES\" requires the addition of the dotphrase \"IMMUNOSUPPRESSED\" as the first diagnosis of the patient's visit.   FAMILY HISTORY:  Any \"first degree relatives\" (parent, brother, sister, or child) with the following?    Skin Cancer, Pancreatic or Other Cancer? No   PATIENT EXPERIENCE:    Do you want the Dermatologist to perform a COMPLETE skin exam today including a clinical examination under the \"bra and underwear\" areas?  NO  If necessary, do we have your permission to call and leave a detailed message on your Preferred Phone number that includes your specific medical information?  Yes      Allergies   Allergen Reactions    Sulfa Antibiotics Rash and GI Intolerance      Current Outpatient Medications:     ketorolac (TORADOL) 10 mg tablet, Take 1 tablet (10 mg total) by mouth every 6 (six) hours as needed for moderate pain, Disp: 30 tablet, Rfl: 3    levocetirizine (XYZAL) 5 MG tablet, Take 1 tablet (5 " mg total) by mouth every evening, Disp: 30 tablet, Rfl: 0    methocarbamol (ROBAXIN) 750 mg tablet, Take 1 tablet (750 mg total) by mouth 4 (four) times a day as needed for muscle spasms for up to 10 days, Disp: 30 tablet, Rfl: 0    Multiple Vitamins-Minerals (MULTI ADULT GUMMIES PO), , Disp: , Rfl:     Nurtec 75 MG TBDP, Take 1 tablet (75 mg total) by mouth every other day, Disp: 30 tablet, Rfl: 3    ondansetron (ZOFRAN) 4 mg tablet, Take 1 tablet (4 mg total) by mouth every 8 (eight) hours as needed for nausea or vomiting, Disp: 20 tablet, Rfl: 2    psyllium (METAMUCIL) 0.52 g capsule, Take 1 capsule (0.52 g total) by mouth daily, Disp: 30 capsule, Rfl: 0          Whom besides the patient is providing clinical information about today's encounter?   NO ADDITIONAL HISTORIAN (patient alone provided history)    Physical Exam and Assessment/Plan by Diagnosis:    VITILIGO    Physical Exam:  Anatomic Location Affected:  axilla, groin, buttocks, forehead  Morphological Description:  depigmented patches   TBSA: ~2%  Pertinent Positives:   Pertinent Negatives: No pruritus     Additional History of Present Condition:  oInitially noticed depigmented patches in the armpit about a year ago. Over the past few months he has notice it on the forehead, buttocks and genital as well. No associated symptoms. He has not had similar lesions before.     Assessment and Plan:  Based on a thorough discussion of this condition and the management approach to it (including a comprehensive discussion of the known risks, side effects and potential benefits of treatment), the patient (family) agrees to implement the following specific plan:  Woods lamp positive for depigmented patches most consistent with vitiligo   Discussed treatment options with patient including topicals and systemic, however patient defers as he's not concerned by the discoloration.   Discussed that vitiligo can be related to other autoimmune diseases and so will recommend  checking TSH which patient agreed to.   Will check Thyroid labs to rule out out  alternative underlying health conditions.   TSH ordered  Recommend strict sun protection     Vitiligo  Vitiligo is an acquired depigmenting disorder of the skin, in which pigment cells (melanocytes) are lost. It presents with well-defined milky-white patches of skin (leukoderma). Vitiligo can be cosmetically very disabling, particularly in people with dark skin.    Vitiligo affects 0.5-1% of the population, and occurs in all races. It may be more common in [place] than elsewhere, with reports of up to 8.8% of the population affected. In 50% of sufferers, pigment loss begins before the age of 20, and in about 80% it begins before the age of 30 years. In 20%, other family members also have vitiligo. Males and females are equally affected.    Even though most people with vitiligo are in good general health, they face a greater risk of having autoimmune diseases such as diabetes, thyroid disease (in 20% of patients over 20 years with vitiligo), pernicious anemia (B12 deficiency), [place] disease (adrenal gland disease), systemic lupus erythematosus, rheumatoid arthritis, psoriasis, and alopecia areata (round patches of hair loss).    A vitiligo-like leukoderma may occur in patients with metastatic melanoma. It can also be induced by certain drugs, such as immune checkpoint inhibitors (pembrolizumab, nivolumab) and BRAF inhibitors (vemurafenib, dabrafenib) used to treat metastatic melanoma. Vitiligo is also three times more common in hematology patients that have had allogeneic bone marrow and stem-cell transplants, than in the normal population.    What causes vitiligo?  Vitiligo is due to the loss or destruction of melanocytes, which are the cells that produce melanin. Melanin determines the color of skin, hair, and eyes. If melanocytes cannot form melanin or if their number decreases, skin color becomes progressively lighter.  Vitiligo is  thought to be a systemic autoimmune disorder, associated with deregulated innate immune response, although this has been disputed for segmental vitiligo. There is a genetic susceptibility. Vitiligo is a component of some rare syndromes, such as the Vogt-Koyanagi-Harada syndrome. The gene encoding the melanocyte enzyme tyrosinase, TYR, is likely involved. It has been reported to be drug induced in association with the methylphenidate transdermal system.    What are the clinical features of vitiligo?  Vitiligo can affect any part of the body. Complete loss of pigment can affect a single patch of skin, or it may affect multiple patches. Small patches or macules are sometimes described as confetti-like.  Common sites are exposed areas (face, neck, eyelids, nostrils, fingertips and toes), body folds (armpits, groin), nipples, navel, lips and genitalia.  Vitiligo also favors sites of injury (cuts, scrapes, acne, thermal burns and sunburn). This is called the Koebner phenomenon.  New-onset vitiligo also sometimes follows emotional stress.  Vitiligo may occasionally start as multiple halo naevi.  Loss of color may also affect the hair on the scalp, eyebrows, eyelashes and body. White hair is called 'leukotrichia' or 'poliosis'.  The retina at the back of the eye may also be affected. However, the colour of the iris does not change.    The color of the edge of the white patch can vary.  It is usually the color of unaffected skin, but sometimes it is hyper pigmented or hypo pigmented.  The term trichrome vitiligo is used to describe three shades of skin color. Very rarely, there are four shades of pigment (white, pale brown, dark brown and normal skin).  Occasionally, each patch of vitiligo has an inflamed red border.    The severity of vitiligo differs with each person. There is no way to predict how much pigment an individual will lose or how fast it will be lost.  Vitiligo appears more obvious in patients with naturally  dark skin.  Extension of vitiligo can occur over a few months, and then it stabilizes.  Some spontaneous regimentation may occur. Brown spots arise from the hair follicles, and the overall size of the white patch may reduce.  At some time in the future, the vitiligo begins to extend again.  Cycles of pigment loss followed by periods of stability may continue indefinitely.  Light skinned people usually notice the pigment loss during the summer as the contrast between the affected skin and suntanned skin becomes more distinct.  Pigment has occasionally been reported to be lost from the entire skin surface.  Vitiligo may be associated with a reduced risk of malignancy  It has been observed that patients that develop a form of vitiligo during treatment with an immune checkpoint inhibitor (such as nivolumab or pembrolizumab prescribed for metastatic melanoma) have enhanced survival rates compared to those who do not develop this complication of the treatment. A nationwide population cohort study published in 2019 reported that patients in [place] with a diagnosis of vitiligo had a reduced incidence of internal malignancies such as cancer of the colon, rectum, ovary and lung. The risk of melanoma and keratinocyte cancer also appears to be less in vitiligo patients than in similar patients without vitiligo. However, it should be noted that the risk of thyroid cancer is greater in patients with vitiligo compared to patients without vitiligo.    How is vitiligo diagnosed?  Vitiligo is normally a clinical diagnosis, and no tests are necessary to make the diagnosis. The white patches may be seen more easily under Wood lamp examination (black light).  Occasionally skin biopsy may be recommended, particularly in early or inflammatory vitiligo, when a lymphocytic infiltration may be observed. Melanocytes and epidermal pigment are absent in established vitiligo patches.    Blood tests to assess other potential autoimmune diseases or  "polyglandular syndromes may be arranged, such as thyroid function, B12 levels and autoantibody screen.  Clinical photographs are useful to document the extent of vitiligo for monitoring. Serial digital images may be arranged on follow-up. The extent of vitiligo may be scored according to the body surface area affected by depigmentation.    How is vitiligo treated?  Treatment of vitiligo is currently unsatisfactory. Repigmentation treatment is most successful on face and trunk; hands, feet and areas with white hair respond poorly. Compared to longstanding patches, new ones are more likely to respond to medical therapy.  When successful regimentation occurs, melanocyte stem cells in the bulb at the base of the hair follicle are activated and migrate to the skin surface. They appear as perifollicular brown macules.    General measures  Minimize skin injury: wear protective clothing.  A cut, a graze, a scratch may lead to a new patch of vitiligo.  Cosmetic camouflage can disguise vitiligo.  Options include:  Make-up, dyes and stains  Waterproof products  Dihydroxyacetone-containing products \"tan without sun\"  Micropigmentation or tattooing for stable vitiligo.  White skin can only burn on exposure to ultraviolet radiation (UVR); it cannot tan.  Sunburn may cause vitiligo to spread.  Tanning of normal skin makes vitiligo patches appear more obvious.    Topical treatments  Corticosteroid creams. These can be used for vitiligo on trunk and limbs for up to 3 months. Potent steroids should be avoided on thin-skinned areas of the face (especially eyelids), neck, armpits and groin.  Calcineurin inhibitors (pimecrolimus cream and tacrolimus ointment. These can be used for vitiligo affecting eyelids, face, neck, armpits and groin.  Experimental treatment with topical ruxolitinib, a Janus kinase inhibitor,  shows great promise for facial vitiligo.    Phototherapy  Phototherapy refers to treatment with ultraviolet (UV) radiation. " "Options include:  Whole-body or localized broadband or narrowband (311 nm) UVB  Excimer laser UVB (308 nm) or targeted UVB for small areas of vitiligo  Oral, topical, or bathwater photo chemotherapy (PUVA)  In-office or home phototherapy.    Treatment is usually given twice weekly for a trial period of 3-4 months. If repigmentation is observed, treatment is continued until repigmentation is complete or for a maximum of 1-2 years.  Phototherapy is unsuitable for very fair skinned people.  The treatment intensity aims for the vitiligo skin to be a light \"carnation\" pink.  If repigmentation is observed, treatment is continued until repigmentation is complete or for a maximum of 1-2 years.  Treatment times are generally brief. The aim is to cause the treated skin to appear very slightly pink the following day.  It is important to avoid burning (red, blistered, peeling, itchy or painful skin), as this could cause the vitiligo to get worse.    Systemic therapy  Systemic treatments for vitiligo include:  Oral minocycline, a tetracycline antibiotic with anti-inflammatory properties  Mini-pulses of oral steroids for 3 to 6 months, such as dexamethasone 2.5 mg, two days per week  Subcutaneous afamelanotide.    Surgical treatment of stable vitiligo  Surgical treatment for stable and segmental vitiligo requires removal of the top layer of vitiligo skin (by shaving, dermabrasion, sandpapering or laser) and replacement with pigmented skin removed from another site.  Techniques include:  Non-cultured melanocyte-keratinocyte cell suspension transplantation.  Punch grafting  Blister grafts, formed by suction or cryotherapy  Split skin grafting  Cultured autografts of melanocytes grown in tissue culture.    Depigmentation therapy  Depigmentation therapy, using monobenzyl ether of hydroquinone, may be considered in severely affected, dark-skinned individuals.    Cryotherapy and laser treatment (eg, 755 nm Q-switched alexandrite or 694 " "nm Q-switched wanda) have also been used successfully to depigment small areas of vitiligo.    Psychosocial effects of vitiligo  Vitiligo results in reduced quality of life and psychological difficulties in many patients, especially in adolescents and in females. The psychosocial effects of vitiligo tend to be more severe in some countries, cultures and religions than in others. Family support, counselling and cognitive behavioral treatment can be of benefit.    SEBORRHEIC DERMATITIS    Physical Exam:  Anatomic Location Affected:  scalp   Morphological Description:  subtle scaly plaques   Pertinent Positives:  Pertinent Negatives:    Additional History of Present Condition:  reported by patient and noticed on exams.     Assessment and Plan:  Based on a thorough discussion of this condition and the management approach to it (including a comprehensive discussion of the known risks, side effects and potential benefits of treatment), the patient (family) agrees to implement the following specific plan:  Prescribing ketoconazole 2% shampoo. Lather into scalp for 5 minutes before rinsing 3 times weekly for the next month and then taper to once weekly thereafter.      Seborrheic Dermatitis   Seborrheic dermatitis is a common, chronic or relapsing form of eczema/dermatitis that mainly affects the sebaceous, gland-rich regions of the scalp, face, and trunk.  There are infantile and adult forms of seborrhoeic dermatitis. It is sometimes associated with psoriasis and, in that clinical scenario, may be referred to as \"sebo-psoriasis.\"  Seborrheic dermatitis is also known as \"seborrheic eczema.\"  Dandruff (also called \"pityriasis capitis\") is an uninflamed form of seborrhoeic dermatitis. Dandruff presents as bran-like scaly patches scattered within hair-bearing areas of the scalp.  In an infant, this condition may be referred to as \"cradle cap.\"  The cause of seborrheic dermatitis is not completely understood. It is associated " "with proliferation of various species of the skin commensal Malassezia, in its yeast (non-pathogenic) form. Its metabolites (such as the fatty acids oleic acid, malssezin, and indole-3-carbaldehyde) may cause an inflammatory reaction. Differences in skin barrier lipid content and function may account for individual presentations.    Infantile Seborrheic Dermatitis  Infantile seborrheic dermatitis affects babies under the age of 3 months and usually resolves by 6-12 months of age.  Infantile seborrheic dermatitis causes \"cradle cap\" (diffuse, greasy scaling on scalp). The rash may spread to affect armpit and groin folds (a type of \"napkin dermatitis\").  There may be associated salmon-pink colored patches that may flake or peel.  The rash in this case is usually not especially itchy, so the baby often appears undisturbed by the rash, even when more generalized.    Adult Seborrheic Dermatitis  Adult seborrheic dermatitis tends to begin in late adolescence; prevalence is greatest in young adults and in the elderly. It is more common in males than in females.    The following factors are sometimes associated with severe adult seborrheic dermatitis:  Oily skin  Familial tendency to seborrhoeic dermatitis or a family history of psoriasis  Immunosuppression: organ transplant recipient, human immunodeficiency virus (HIV) infection and patients with lymphoma  Neurological and psychiatric diseases: Parkinson disease, tardive dyskinesia, depression, epilepsy, facial nerve palsy, spinal cord injury and congenital disorders such as Down syndrome  Treatment for psoriasis with psoralen and ultraviolet A (PUVA) therapy  Lack of sleep  Stressful events.    In adults, seborrheic dermatitis may typically affect the scalp, face (creases around the nose, behind ears, within eyebrows) and upper trunk. Typical clinical features include:  Winter flares, improving in summer following sun exposure  Minimal itch most of the time  Combination " oily and dry mid-facial skin  Ill-defined localized scaly patches or diffuse scale in the scalp  Blepharitis; scaly red eyelid margins  [place]-pink, thin, scaly, and ill-defined plaques in skin folds on both sides of the face  Petal or ring-shaped flaky patches on hair-line and on anterior chest  Rash in armpits, under the breasts, in the groin folds and genital creases  Superficial folliculitis (inflamed hair follicles) on cheeks and upper trunk    Seborrheic dermatitis is diagnosed by its clinical appearance and behavior. Skin biopsy may be helpful but is rarely necessary to make this diagnosis.      Scribe Attestation      I,:  Luís Dick MA am acting as a scribe while in the presence of the attending physician.:       I,:  Michael Bean MD personally performed the services described in this documentation    as scribed in my presence.:

## 2025-04-03 NOTE — PATIENT INSTRUCTIONS
VITILIGO    Assessment and Plan:  Based on a thorough discussion of this condition and the management approach to it (including a comprehensive discussion of the known risks, side effects and potential benefits of treatment), the patient (family) agrees to implement the following specific plan:  Will check Thyroid labs to rule out out  alternative underlying health conditions.     Vitiligo  Vitiligo is an acquired depigmenting disorder of the skin, in which pigment cells (melanocytes) are lost. It presents with well-defined milky-white patches of skin (leukoderma). Vitiligo can be cosmetically very disabling, particularly in people with dark skin.    Vitiligo affects 0.5-1% of the population, and occurs in all races. It may be more common in [place] than elsewhere, with reports of up to 8.8% of the population affected. In 50% of sufferers, pigment loss begins before the age of 20, and in about 80% it begins before the age of 30 years. In 20%, other family members also have vitiligo. Males and females are equally affected.    Even though most people with vitiligo are in good general health, they face a greater risk of having autoimmune diseases such as diabetes, thyroid disease (in 20% of patients over 20 years with vitiligo), pernicious anemia (B12 deficiency), Jeffrey disease (adrenal gland disease), systemic lupus erythematosus, rheumatoid arthritis, psoriasis, and alopecia areata (round patches of hair loss).    A vitiligo-like leukoderma may occur in patients with metastatic melanoma. It can also be induced by certain drugs, such as immune checkpoint inhibitors (pembrolizumab, nivolumab) and BRAF inhibitors (vemurafenib, dabrafenib) used to treat metastatic melanoma. Vitiligo is also three times more common in hematology patients that have had allogeneic bone marrow and stem-cell transplants, than in the normal population.    What causes vitiligo?  Vitiligo is due to the loss or destruction of melanocytes, which  are the cells that produce melanin. Melanin determines the color of skin, hair, and eyes. If melanocytes cannot form melanin or if their number decreases, skin color becomes progressively lighter.  Vitiligo is thought to be a systemic autoimmune disorder, associated with deregulated innate immune response, although this has been disputed for segmental vitiligo. There is a genetic susceptibility. Vitiligo is a component of some rare syndromes, such as the Vogt-Koyanagi-Harada syndrome. The gene encoding the melanocyte enzyme tyrosinase, TYR, is likely involved. It has been reported to be drug induced in association with the methylphenidate transdermal system.    What are the clinical features of vitiligo?  Vitiligo can affect any part of the body. Complete loss of pigment can affect a single patch of skin, or it may affect multiple patches. Small patches or macules are sometimes described as confetti-like.  Common sites are exposed areas (face, neck, eyelids, nostrils, fingertips and toes), body folds (armpits, groin), nipples, navel, lips and genitalia.  Vitiligo also favors sites of injury (cuts, scrapes, acne, thermal burns and sunburn). This is called the Koebner phenomenon.  New-onset vitiligo also sometimes follows emotional stress.  Vitiligo may occasionally start as multiple halo naevi.  Loss of color may also affect the hair on the scalp, eyebrows, eyelashes and body. White hair is called 'leukotrichia' or 'poliosis'.  The retina at the back of the eye may also be affected. However, the colour of the iris does not change.    The color of the edge of the white patch can vary.  It is usually the color of unaffected skin, but sometimes it is hyper pigmented or hypo pigmented.  The term trichrome vitiligo is used to describe three shades of skin color. Very rarely, there are four shades of pigment (white, pale brown, dark brown and normal skin).  Occasionally, each patch of vitiligo has an inflamed red  border.    The severity of vitiligo differs with each person. There is no way to predict how much pigment an individual will lose or how fast it will be lost.  Vitiligo appears more obvious in patients with naturally dark skin.  Extension of vitiligo can occur over a few months, and then it stabilizes.  Some spontaneous regimentation may occur. Brown spots arise from the hair follicles, and the overall size of the white patch may reduce.  At some time in the future, the vitiligo begins to extend again.  Cycles of pigment loss followed by periods of stability may continue indefinitely.  Light skinned people usually notice the pigment loss during the summer as the contrast between the affected skin and suntanned skin becomes more distinct.  Pigment has occasionally been reported to be lost from the entire skin surface.  Vitiligo may be associated with a reduced risk of malignancy  It has been observed that patients that develop a form of vitiligo during treatment with an immune checkpoint inhibitor (such as nivolumab or pembrolizumab prescribed for metastatic melanoma) have enhanced survival rates compared to those who do not develop this complication of the treatment. A nationwide population cohort study published in 2019 reported that patients in [place] with a diagnosis of vitiligo had a reduced incidence of internal malignancies such as cancer of the colon, rectum, ovary and lung. The risk of melanoma and keratinocyte cancer also appears to be less in vitiligo patients than in similar patients without vitiligo. However, it should be noted that the risk of thyroid cancer is greater in patients with vitiligo compared to patients without vitiligo.    How is vitiligo diagnosed?  Vitiligo is normally a clinical diagnosis, and no tests are necessary to make the diagnosis. The white patches may be seen more easily under Wood lamp examination (black light).  Occasionally skin biopsy may be recommended, particularly in early  "or inflammatory vitiligo, when a lymphocytic infiltration may be observed. Melanocytes and epidermal pigment are absent in established vitiligo patches.    Blood tests to assess other potential autoimmune diseases or polyglandular syndromes may be arranged, such as thyroid function, B12 levels and autoantibody screen.  Clinical photographs are useful to document the extent of vitiligo for monitoring. Serial digital images may be arranged on follow-up. The extent of vitiligo may be scored according to the body surface area affected by depigmentation.    How is vitiligo treated?  Treatment of vitiligo is currently unsatisfactory. Repigmentation treatment is most successful on face and trunk; hands, feet and areas with white hair respond poorly. Compared to longstanding patches, new ones are more likely to respond to medical therapy.  When successful regimentation occurs, melanocyte stem cells in the bulb at the base of the hair follicle are activated and migrate to the skin surface. They appear as perifollicular brown macules.    General measures  Minimize skin injury: wear protective clothing.  A cut, a graze, a scratch may lead to a new patch of vitiligo.  Cosmetic camouflage can disguise vitiligo.  Options include:  Make-up, dyes and stains  Waterproof products  Dihydroxyacetone-containing products \"tan without sun\"  Micropigmentation or tattooing for stable vitiligo.  White skin can only burn on exposure to ultraviolet radiation (UVR); it cannot tan.  Sunburn may cause vitiligo to spread.  Tanning of normal skin makes vitiligo patches appear more obvious.    Topical treatments  Corticosteroid creams. These can be used for vitiligo on trunk and limbs for up to 3 months. Potent steroids should be avoided on thin-skinned areas of the face (especially eyelids), neck, armpits and groin.  Calcineurin inhibitors (pimecrolimus cream and tacrolimus ointment. These can be used for vitiligo affecting eyelids, face, neck, " "armpits and groin.  Experimental treatment with topical ruxolitinib, a Janus kinase inhibitor,  shows great promise for facial vitiligo.    Phototherapy  Phototherapy refers to treatment with ultraviolet (UV) radiation. Options include:  Whole-body or localized broadband or narrowband (311 nm) UVB  Excimer laser UVB (308 nm) or targeted UVB for small areas of vitiligo  Oral, topical, or bathwater photo chemotherapy (PUVA)  In-office or home phototherapy.    Treatment is usually given twice weekly for a trial period of 3-4 months. If repigmentation is observed, treatment is continued until repigmentation is complete or for a maximum of 1-2 years.  Phototherapy is unsuitable for very fair skinned people.  The treatment intensity aims for the vitiligo skin to be a light \"carnation\" pink.  If repigmentation is observed, treatment is continued until repigmentation is complete or for a maximum of 1-2 years.  Treatment times are generally brief. The aim is to cause the treated skin to appear very slightly pink the following day.  It is important to avoid burning (red, blistered, peeling, itchy or painful skin), as this could cause the vitiligo to get worse.    Systemic therapy  Systemic treatments for vitiligo include:  Oral minocycline, a tetracycline antibiotic with anti-inflammatory properties  Mini-pulses of oral steroids for 3 to 6 months, such as dexamethasone 2.5 mg, two days per week  Subcutaneous afamelanotide.    Surgical treatment of stable vitiligo  Surgical treatment for stable and segmental vitiligo requires removal of the top layer of vitiligo skin (by shaving, dermabrasion, sandpapering or laser) and replacement with pigmented skin removed from another site.  Techniques include:  Non-cultured melanocyte-keratinocyte cell suspension transplantation.  Punch grafting  Blister grafts, formed by suction or cryotherapy  Split skin grafting  Cultured autografts of melanocytes grown in tissue " culture.    Depigmentation therapy  Depigmentation therapy, using monobenzyl ether of hydroquinone, may be considered in severely affected, dark-skinned individuals.    Cryotherapy and laser treatment (eg, 755 nm Q-switched alexandrite or 694 nm Q-switched wanda) have also been used successfully to depigment small areas of vitiligo.    Psychosocial effects of vitiligo  Vitiligo results in reduced quality of life and psychological difficulties in many patients, especially in adolescents and in females. The psychosocial effects of vitiligo tend to be more severe in some countries, cultures and religions than in others. Family support, counselling and cognitive behavioral treatment can be of benefit.

## 2025-04-04 ENCOUNTER — APPOINTMENT (OUTPATIENT)
Dept: LAB | Facility: CLINIC | Age: 30
End: 2025-04-04
Payer: COMMERCIAL

## 2025-04-04 DIAGNOSIS — L80 VITILIGO: ICD-10-CM

## 2025-04-04 DIAGNOSIS — Z00.8 HEALTH EXAMINATION IN POPULATION SURVEY: ICD-10-CM

## 2025-04-04 LAB
EST. AVERAGE GLUCOSE BLD GHB EST-MCNC: 111 MG/DL
HBA1C MFR BLD: 5.5 %
TSH SERPL DL<=0.05 MIU/L-ACNC: 3.14 UIU/ML (ref 0.45–4.5)

## 2025-04-04 PROCEDURE — 36415 COLL VENOUS BLD VENIPUNCTURE: CPT

## 2025-04-04 PROCEDURE — 84443 ASSAY THYROID STIM HORMONE: CPT

## 2025-04-04 PROCEDURE — 83036 HEMOGLOBIN GLYCOSYLATED A1C: CPT

## 2025-04-09 ENCOUNTER — RESULTS FOLLOW-UP (OUTPATIENT)
Dept: MULTI SPECIALTY CLINIC | Facility: CLINIC | Age: 30
End: 2025-04-09

## 2025-04-09 NOTE — RESULT ENCOUNTER NOTE
BLOOD WORK  RESULT NOTE    Results reviewed by ordering physician.  Called patient to personally discuss results. No answer, left voicemail with result.      Instructions for Clinical Derm Team:   (remember to route Result Note to appropriate staff):    None    Result & Plan by Specimen:    TSH wnl   Plan: No further tests needed.

## 2025-05-01 ENCOUNTER — TELEPHONE (OUTPATIENT)
Dept: CARDIOLOGY CLINIC | Facility: CLINIC | Age: 30
End: 2025-05-01

## 2025-05-01 ENCOUNTER — OFFICE VISIT (OUTPATIENT)
Dept: CARDIOLOGY CLINIC | Facility: CLINIC | Age: 30
End: 2025-05-01
Payer: COMMERCIAL

## 2025-05-01 VITALS
DIASTOLIC BLOOD PRESSURE: 60 MMHG | BODY MASS INDEX: 38.83 KG/M2 | SYSTOLIC BLOOD PRESSURE: 126 MMHG | HEART RATE: 66 BPM | OXYGEN SATURATION: 98 % | WEIGHT: 240.6 LBS

## 2025-05-01 DIAGNOSIS — Z82.49 FAMILY HISTORY OF PREMATURE CAD: Primary | ICD-10-CM

## 2025-05-01 PROCEDURE — 99214 OFFICE O/P EST MOD 30 MIN: CPT | Performed by: INTERNAL MEDICINE

## 2025-05-01 NOTE — PROGRESS NOTES
John Isai  1995  [id number]  [facility] [address] [id number] [phone]    1. Family history of premature CAD            Discussion/Summary: Overall he is doing great no symptoms calcium score is 0.  Blood pressure is well-controlled.  Recent stress test and echocardiogram were unremarkable.  He can follow-up with me in 1 to 2 years for ongoing risk assessment      Interval History: 29-year-old gentleman with a history of migraines presents as a new patient consult on behalf of Dr. Alexis for shortness of breath on exertion.  He tells me sometimes even doing mild things within the home such as getting out of bed and walking room to room can make him feel short of breath.  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  He denies any chest pain or discomfort at the time.  There is no palpitations or heart racing.  Denies any lower extremity edema, PND, orthopnea.  He has had no history of syncope.  No significant aggravating factors.  He does have a premature history of early coronary disease in his family.  Remote history of smoking    Following her last visit he has been doing well.  Shortness of breath has improved denies any chest pain, palpitations, lightheadedness, dizziness, or syncope.  There is no lower extremity ANJELICA, PND, orthopnea.  He has been taking her medications as prescribed.    Medical Problems       Problem List       Muscle spasm of back    Bruised ribs, left, initial encounter    Nonintractable headache    CHAUHAN (dyspnea on exertion)        Past Medical History:   Diagnosis Date    Allergic     Anxiety     Asthma     Depression     Headache(784.0)     Migraine      Social History     Socioeconomic History    Marital status: /Civil Union     Spouse name: Not on file    Number of children: Not on file    Years of education: Not on file     Highest education level: Not on file   Occupational History    Not on file   Tobacco Use    Smoking status: Former     Current packs/day: 0.00     Types: Cigarettes     Quit date: 2023     Years since quittin.3     Passive exposure: Never    Smokeless tobacco: Never   Vaping Use    Vaping status: Never Used   Substance and Sexual Activity    Alcohol use: Never    Drug use: Never    Sexual activity: Yes   Other Topics Concern    Not on file   Social History Narrative    Not on file     Social Drivers of Health     Financial Resource Strain: Not on file   Food Insecurity: No Food Insecurity (3/12/2025)    Hunger Vital Sign     Worried About Running Out of Food in the Last Year: Never true     Ran Out of Food in the Last Year: Never true   Transportation Needs: No Transportation Needs (3/12/2025)    PRAPARE - Transportation     Lack of Transportation (Medical): No     Lack of Transportation (Non-Medical): No   Physical Activity: Not on file   Stress: Not on file   Social Connections: Unknown (2024)    Received from [facility]    Social Connections     How often do you feel lonely or isolated from those around you? (Adult - for ages 18 years and over): Not on file   Intimate Partner Violence: Not on file   Housing Stability: Low Risk  (3/12/2025)    Housing Stability Vital Sign     Unable to Pay for Housing in the Last Year: No     Number of Times Moved in the Last Year: 0     Homeless in the Last Year: No      Family History   Problem Relation Age of Onset    Hypertension Mother     Diabetes Mother     Depression Father     Hypertension Father     Heart disease Father     Diabetes Father     Cancer Maternal Grandfather     Stroke Maternal Grandmother     Cancer Paternal Grandfather     Dementia Paternal Grandmother     Depression Sister     Hypertension Brother      History reviewed. No pertinent surgical history.    Current Outpatient Medications:     ketoconazole (NIZORAL) 2 % shampoo, Lather into scalp  "for 5 minutes before rinsing off three times weekly for the next month and then taper to once weekly thereafter., Disp: 120 mL, Rfl: 5    ketorolac (TORADOL) 10 mg tablet, Take 1 tablet (10 mg total) by mouth every 6 (six) hours as needed for moderate pain, Disp: 30 tablet, Rfl: 3    levocetirizine (XYZAL) 5 MG tablet, Take 1 tablet (5 mg total) by mouth every evening, Disp: 30 tablet, Rfl: 0    methocarbamol (ROBAXIN) 750 mg tablet, Take 1 tablet (750 mg total) by mouth 4 (four) times a day as needed for muscle spasms for up to 10 days, Disp: 30 tablet, Rfl: 0    Multiple Vitamins-Minerals (MULTI ADULT GUMMIES PO), , Disp: , Rfl:     Nurtec 75 MG TBDP, Take 1 tablet (75 mg total) by mouth every other day, Disp: 30 tablet, Rfl: 3    ondansetron (ZOFRAN) 4 mg tablet, Take 1 tablet (4 mg total) by mouth every 8 (eight) hours as needed for nausea or vomiting, Disp: 20 tablet, Rfl: 2    psyllium (METAMUCIL) 0.52 g capsule, Take 1 capsule (0.52 g total) by mouth daily, Disp: 30 capsule, Rfl: 0  Allergies   Allergen Reactions    Sulfa Antibiotics Rash and GI Intolerance       Labs:     Chemistry        Component Value Date/Time    K 4.0 02/19/2025 0704     02/19/2025 0704    CO2 28 02/19/2025 0704    BUN 18 02/19/2025 0704    CREATININE 0.72 02/19/2025 0704        Component Value Date/Time    CALCIUM 10.0 02/19/2025 0704    ALKPHOS 70 02/19/2025 0704    AST 22 02/19/2025 0704    ALT 36 02/19/2025 0704            No results found for: \"CHOL\"  Lab Results   Component Value Date    HDL 56 02/19/2025    HDL 52 01/15/2024     Lab Results   Component Value Date    LDLCALC 112 (H) 02/19/2025    LDLCALC 87 01/15/2024     Lab Results   Component Value Date    TRIG 113 02/19/2025    TRIG 124 01/15/2024     No results found for: \"CHOLHDL\"    Imaging: No results found.    ECG:  NSR      Review of Systems   Constitutional: Negative.   HENT: Negative.     Eyes: Negative.    Cardiovascular: Negative.    Respiratory: Negative.   "   Endocrine: Negative.    Hematologic/Lymphatic: Negative.    Skin: Negative.    Musculoskeletal: Negative.    Gastrointestinal: Negative.    Genitourinary: Negative.    Neurological: Negative.    Psychiatric/Behavioral: Negative.     All other systems reviewed and are negative.      Vitals:    05/01/25 1256   BP: 126/60   Pulse: 66   SpO2: 98%     Vitals:    05/01/25 1256   Weight: 109 kg (240 lb 9.6 oz)         Body mass index is 38.83 kg/m².    Physical Exam:  Vital signs reviewed  General:  Alert and cooperative, appears stated age, no acute distress  HEENT:  PERRLA, EOMI, no scleral icterus, no conjunctival pallor  Neck:  No lymphadenopathy, no thyromegaly, no carotid bruits, no elevated JVP  Heart:  Regular rate and rhythm, normal S1/S2, no S3/S4, no murmur, rubs or gallops.  PMI nondisplaced  Lungs:  Clear to auscultation bilaterally, no wheezes rales or rhonchi  Abdomen:  Soft, non-tender, positive bowel sounds, no rebound or guarding,   no organomegaly   Extremities:  Normal range of motion.  No clubbing, cyanosis or edema   Vascular:  2+ pedal pulses  Skin:  No rashes or lesions on exposed skin  Neurologic:  Cranial nerves II-XII grossly intact without focal deficits  Psych:  Normal mood and affect